# Patient Record
Sex: FEMALE | NOT HISPANIC OR LATINO | URBAN - METROPOLITAN AREA
[De-identification: names, ages, dates, MRNs, and addresses within clinical notes are randomized per-mention and may not be internally consistent; named-entity substitution may affect disease eponyms.]

---

## 2019-01-08 ENCOUNTER — INPATIENT (INPATIENT)
Facility: HOSPITAL | Age: 84
LOS: 6 days | Discharge: HOME | DRG: 202 | End: 2019-01-15
Attending: HOSPITALIST | Admitting: HOSPITALIST
Payer: MEDICARE

## 2019-01-08 ENCOUNTER — TRANSCRIPTION ENCOUNTER (OUTPATIENT)
Age: 84
End: 2019-01-08

## 2019-01-08 VITALS
DIASTOLIC BLOOD PRESSURE: 74 MMHG | OXYGEN SATURATION: 99 % | HEIGHT: 62 IN | WEIGHT: 139.99 LBS | TEMPERATURE: 99 F | SYSTOLIC BLOOD PRESSURE: 152 MMHG | HEART RATE: 100 BPM | RESPIRATION RATE: 18 BRPM

## 2019-01-08 DIAGNOSIS — Z96.641 PRESENCE OF RIGHT ARTIFICIAL HIP JOINT: Chronic | ICD-10-CM

## 2019-01-08 DIAGNOSIS — Z98.41 CATARACT EXTRACTION STATUS, RIGHT EYE: Chronic | ICD-10-CM

## 2019-01-08 LAB
ALBUMIN SERPL ELPH-MCNC: 3.2 G/DL — LOW (ref 3.5–5)
ALP SERPL-CCNC: 93 U/L — SIGNIFICANT CHANGE UP (ref 40–120)
ALT FLD-CCNC: 17 U/L DA — SIGNIFICANT CHANGE UP (ref 10–60)
ANION GAP SERPL CALC-SCNC: 9 MMOL/L — SIGNIFICANT CHANGE UP (ref 5–17)
AST SERPL-CCNC: 18 U/L — SIGNIFICANT CHANGE UP (ref 10–40)
BASE EXCESS BLDV CALC-SCNC: 1.2 MMOL/L — SIGNIFICANT CHANGE UP (ref -2–2)
BASOPHILS # BLD AUTO: 0.1 K/UL — SIGNIFICANT CHANGE UP (ref 0–0.2)
BASOPHILS NFR BLD AUTO: 1.1 % — SIGNIFICANT CHANGE UP (ref 0–2)
BILIRUB SERPL-MCNC: 0.4 MG/DL — SIGNIFICANT CHANGE UP (ref 0.2–1.2)
BLOOD GAS COMMENTS, VENOUS: SIGNIFICANT CHANGE UP
BUN SERPL-MCNC: 16 MG/DL — SIGNIFICANT CHANGE UP (ref 7–18)
CALCIUM SERPL-MCNC: 9.1 MG/DL — SIGNIFICANT CHANGE UP (ref 8.4–10.5)
CHLORIDE SERPL-SCNC: 102 MMOL/L — SIGNIFICANT CHANGE UP (ref 96–108)
CO2 SERPL-SCNC: 29 MMOL/L — SIGNIFICANT CHANGE UP (ref 22–31)
CREAT SERPL-MCNC: 0.76 MG/DL — SIGNIFICANT CHANGE UP (ref 0.5–1.3)
EOSINOPHIL # BLD AUTO: 0.3 K/UL — SIGNIFICANT CHANGE UP (ref 0–0.5)
EOSINOPHIL NFR BLD AUTO: 2.6 % — SIGNIFICANT CHANGE UP (ref 0–6)
GLUCOSE SERPL-MCNC: 120 MG/DL — HIGH (ref 70–99)
HCO3 BLDV-SCNC: 28 MMOL/L — SIGNIFICANT CHANGE UP (ref 21–29)
HCOV OC43 RNA SPEC QL NAA+PROBE: DETECTED
HCOV PNL SPEC NAA+PROBE: DETECTED
HCT VFR BLD CALC: 42.8 % — SIGNIFICANT CHANGE UP (ref 34.5–45)
HGB BLD-MCNC: 13.3 G/DL — SIGNIFICANT CHANGE UP (ref 11.5–15.5)
HOROWITZ INDEX BLDV+IHG-RTO: 21 — SIGNIFICANT CHANGE UP
LACTATE SERPL-SCNC: 1.7 MMOL/L — SIGNIFICANT CHANGE UP (ref 0.7–2)
LYMPHOCYTES # BLD AUTO: 2.5 K/UL — SIGNIFICANT CHANGE UP (ref 1–3.3)
LYMPHOCYTES # BLD AUTO: 22 % — SIGNIFICANT CHANGE UP (ref 13–44)
MCHC RBC-ENTMCNC: 27.2 PG — SIGNIFICANT CHANGE UP (ref 27–34)
MCHC RBC-ENTMCNC: 31.1 GM/DL — LOW (ref 32–36)
MCV RBC AUTO: 87.4 FL — SIGNIFICANT CHANGE UP (ref 80–100)
MONOCYTES # BLD AUTO: 1.2 K/UL — HIGH (ref 0–0.9)
MONOCYTES NFR BLD AUTO: 10.7 % — SIGNIFICANT CHANGE UP (ref 2–14)
NEUTROPHILS # BLD AUTO: 7.3 K/UL — SIGNIFICANT CHANGE UP (ref 1.8–7.4)
NEUTROPHILS NFR BLD AUTO: 63.6 % — SIGNIFICANT CHANGE UP (ref 43–77)
NT-PROBNP SERPL-SCNC: 88 PG/ML — SIGNIFICANT CHANGE UP (ref 0–450)
PCO2 BLDV: 57 MMHG — HIGH (ref 35–50)
PH BLDV: 7.31 — LOW (ref 7.35–7.45)
PLATELET # BLD AUTO: 297 K/UL — SIGNIFICANT CHANGE UP (ref 150–400)
PO2 BLDV: 30 MMHG — SIGNIFICANT CHANGE UP (ref 25–45)
POTASSIUM SERPL-MCNC: 3.6 MMOL/L — SIGNIFICANT CHANGE UP (ref 3.5–5.3)
POTASSIUM SERPL-SCNC: 3.6 MMOL/L — SIGNIFICANT CHANGE UP (ref 3.5–5.3)
PROT SERPL-MCNC: 7.9 G/DL — SIGNIFICANT CHANGE UP (ref 6–8.3)
RAPID RVP RESULT: DETECTED
RBC # BLD: 4.89 M/UL — SIGNIFICANT CHANGE UP (ref 3.8–5.2)
RBC # FLD: 13.1 % — SIGNIFICANT CHANGE UP (ref 10.3–14.5)
SAO2 % BLDV: 48 % — LOW (ref 67–88)
SODIUM SERPL-SCNC: 140 MMOL/L — SIGNIFICANT CHANGE UP (ref 135–145)
TROPONIN I SERPL-MCNC: <0.015 NG/ML — SIGNIFICANT CHANGE UP (ref 0–0.04)
WBC # BLD: 11.4 K/UL — HIGH (ref 3.8–10.5)
WBC # FLD AUTO: 11.4 K/UL — HIGH (ref 3.8–10.5)

## 2019-01-08 PROCEDURE — 71045 X-RAY EXAM CHEST 1 VIEW: CPT | Mod: 26

## 2019-01-08 RX ORDER — SODIUM CHLORIDE 9 MG/ML
500 INJECTION INTRAMUSCULAR; INTRAVENOUS; SUBCUTANEOUS ONCE
Qty: 0 | Refills: 0 | Status: COMPLETED | OUTPATIENT
Start: 2019-01-08 | End: 2019-01-08

## 2019-01-08 RX ORDER — AZITHROMYCIN 500 MG/1
1 TABLET, FILM COATED ORAL
Qty: 4 | Refills: 0 | OUTPATIENT
Start: 2019-01-08 | End: 2019-01-11

## 2019-01-08 RX ORDER — AZITHROMYCIN 500 MG/1
500 TABLET, FILM COATED ORAL ONCE
Qty: 0 | Refills: 0 | Status: COMPLETED | OUTPATIENT
Start: 2019-01-08 | End: 2019-01-08

## 2019-01-08 RX ORDER — ACETAMINOPHEN 500 MG
650 TABLET ORAL ONCE
Qty: 0 | Refills: 0 | Status: COMPLETED | OUTPATIENT
Start: 2019-01-08 | End: 2019-01-08

## 2019-01-08 RX ADMIN — Medication 650 MILLIGRAM(S): at 22:45

## 2019-01-08 RX ADMIN — SODIUM CHLORIDE 500 MILLILITER(S): 9 INJECTION INTRAMUSCULAR; INTRAVENOUS; SUBCUTANEOUS at 22:17

## 2019-01-08 RX ADMIN — Medication 650 MILLIGRAM(S): at 22:18

## 2019-01-08 NOTE — ED PROVIDER NOTE - PHYSICAL EXAMINATION
PE:   GEN: Awake, mildly lethargic, nonverbal elderly woman in NAD  HEAD: Atraumatic, normocephalic  EYES: Sclera white, conjunctiva pink, PERRLA  CARDIAC: borderline tachycardia, regular rhythm, S1,S2, no murmur/rub/gallop. Strong central and peripheral pulses, Brisk cap refill, no evident pedal edema.   RESP: Normal respiratory rate and effort, no resp distress, few scattered rhonchi, poor excursion  ABD: soft, non-tender, nondistended  NEURO: awake, responsive, exam limited by dementia  MSK: Moving all extremities spontaneously, no apparent deformities  SKIN: warm, dry

## 2019-01-08 NOTE — ED PROVIDER NOTE - MEDICAL DECISION MAKING DETAILS
3-4d infectious symptoms, no resp distress, fever at urgent care, r/o severe sepsis PNA, influenza, myocarditis, ACS. no PE risk factors except age and tachycardia likely 2/2 infection but if clinical picture changes r/o w/d-dimer. plan: ekg cbc bmp trop bnp vbg cxr reassess

## 2019-01-08 NOTE — ED PROVIDER NOTE - OBJECTIVE STATEMENT
89F h/o dementia depression pw 3-4d cough, decreased po intake and level of alertness, fever (102 at urgent care). contact with family member with uri at onset. family deny recent hospitalizations recent abx travel rashes vomiting diarrhea cardiac hx. hx limited by dementia.

## 2019-01-08 NOTE — ED PROVIDER NOTE - PROGRESS NOTE DETAILS
on repeat VS tachycardia worsened, d/w family and agreed to admission now, turned over to medicine team, dimer added

## 2019-01-09 ENCOUNTER — TRANSCRIPTION ENCOUNTER (OUTPATIENT)
Age: 84
End: 2019-01-09

## 2019-01-09 DIAGNOSIS — J06.9 ACUTE UPPER RESPIRATORY INFECTION, UNSPECIFIED: ICD-10-CM

## 2019-01-09 DIAGNOSIS — A41.9 SEPSIS, UNSPECIFIED ORGANISM: ICD-10-CM

## 2019-01-09 DIAGNOSIS — R00.0 TACHYCARDIA, UNSPECIFIED: ICD-10-CM

## 2019-01-09 DIAGNOSIS — G30.8 OTHER ALZHEIMER'S DISEASE: ICD-10-CM

## 2019-01-09 DIAGNOSIS — G93.41 METABOLIC ENCEPHALOPATHY: ICD-10-CM

## 2019-01-09 DIAGNOSIS — R09.02 HYPOXEMIA: ICD-10-CM

## 2019-01-09 DIAGNOSIS — Z29.9 ENCOUNTER FOR PROPHYLACTIC MEASURES, UNSPECIFIED: ICD-10-CM

## 2019-01-09 LAB
ANION GAP SERPL CALC-SCNC: 7 MMOL/L — SIGNIFICANT CHANGE UP (ref 5–17)
APTT BLD: 37.4 SEC — HIGH (ref 27.5–36.3)
BASOPHILS # BLD AUTO: 0.1 K/UL — SIGNIFICANT CHANGE UP (ref 0–0.2)
BASOPHILS NFR BLD AUTO: 0.9 % — SIGNIFICANT CHANGE UP (ref 0–2)
BUN SERPL-MCNC: 14 MG/DL — SIGNIFICANT CHANGE UP (ref 7–18)
CALCIUM SERPL-MCNC: 8.6 MG/DL — SIGNIFICANT CHANGE UP (ref 8.4–10.5)
CHLORIDE SERPL-SCNC: 109 MMOL/L — HIGH (ref 96–108)
CO2 SERPL-SCNC: 27 MMOL/L — SIGNIFICANT CHANGE UP (ref 22–31)
CREAT SERPL-MCNC: 0.71 MG/DL — SIGNIFICANT CHANGE UP (ref 0.5–1.3)
EOSINOPHIL # BLD AUTO: 0.1 K/UL — SIGNIFICANT CHANGE UP (ref 0–0.5)
EOSINOPHIL NFR BLD AUTO: 1.6 % — SIGNIFICANT CHANGE UP (ref 0–6)
GLUCOSE SERPL-MCNC: 126 MG/DL — HIGH (ref 70–99)
HCT VFR BLD CALC: 40.6 % — SIGNIFICANT CHANGE UP (ref 34.5–45)
HGB BLD-MCNC: 12.9 G/DL — SIGNIFICANT CHANGE UP (ref 11.5–15.5)
INR BLD: 1.17 RATIO — HIGH (ref 0.88–1.16)
LYMPHOCYTES # BLD AUTO: 2.7 K/UL — SIGNIFICANT CHANGE UP (ref 1–3.3)
LYMPHOCYTES # BLD AUTO: 30.6 % — SIGNIFICANT CHANGE UP (ref 13–44)
MCHC RBC-ENTMCNC: 27.5 PG — SIGNIFICANT CHANGE UP (ref 27–34)
MCHC RBC-ENTMCNC: 31.7 GM/DL — LOW (ref 32–36)
MCV RBC AUTO: 86.9 FL — SIGNIFICANT CHANGE UP (ref 80–100)
MONOCYTES # BLD AUTO: 0.8 K/UL — SIGNIFICANT CHANGE UP (ref 0–0.9)
MONOCYTES NFR BLD AUTO: 8.8 % — SIGNIFICANT CHANGE UP (ref 2–14)
NEUTROPHILS # BLD AUTO: 5.2 K/UL — SIGNIFICANT CHANGE UP (ref 1.8–7.4)
NEUTROPHILS NFR BLD AUTO: 58.1 % — SIGNIFICANT CHANGE UP (ref 43–77)
PLATELET # BLD AUTO: 266 K/UL — SIGNIFICANT CHANGE UP (ref 150–400)
POTASSIUM SERPL-MCNC: 3.3 MMOL/L — LOW (ref 3.5–5.3)
POTASSIUM SERPL-SCNC: 3.3 MMOL/L — LOW (ref 3.5–5.3)
PROTHROM AB SERPL-ACNC: 13.1 SEC — HIGH (ref 10–12.9)
RBC # BLD: 4.67 M/UL — SIGNIFICANT CHANGE UP (ref 3.8–5.2)
RBC # FLD: 12.6 % — SIGNIFICANT CHANGE UP (ref 10.3–14.5)
SODIUM SERPL-SCNC: 143 MMOL/L — SIGNIFICANT CHANGE UP (ref 135–145)
TSH SERPL-MCNC: 1.83 UU/ML — SIGNIFICANT CHANGE UP (ref 0.34–4.82)
WBC # BLD: 8.9 K/UL — SIGNIFICANT CHANGE UP (ref 3.8–10.5)
WBC # FLD AUTO: 8.9 K/UL — SIGNIFICANT CHANGE UP (ref 3.8–10.5)

## 2019-01-09 PROCEDURE — 99223 1ST HOSP IP/OBS HIGH 75: CPT

## 2019-01-09 PROCEDURE — 99285 EMERGENCY DEPT VISIT HI MDM: CPT | Mod: 25

## 2019-01-09 RX ORDER — AZITHROMYCIN 500 MG/1
500 TABLET, FILM COATED ORAL DAILY
Qty: 0 | Refills: 0 | Status: DISCONTINUED | OUTPATIENT
Start: 2019-01-09 | End: 2019-01-11

## 2019-01-09 RX ORDER — SODIUM CHLORIDE 9 MG/ML
1000 INJECTION INTRAMUSCULAR; INTRAVENOUS; SUBCUTANEOUS ONCE
Qty: 0 | Refills: 0 | Status: COMPLETED | OUTPATIENT
Start: 2019-01-09 | End: 2019-01-09

## 2019-01-09 RX ORDER — IPRATROPIUM/ALBUTEROL SULFATE 18-103MCG
3 AEROSOL WITH ADAPTER (GRAM) INHALATION EVERY 6 HOURS
Qty: 0 | Refills: 0 | Status: DISCONTINUED | OUTPATIENT
Start: 2019-01-09 | End: 2019-01-15

## 2019-01-09 RX ORDER — ACETAMINOPHEN 500 MG
325 TABLET ORAL EVERY 4 HOURS
Qty: 0 | Refills: 0 | Status: DISCONTINUED | OUTPATIENT
Start: 2019-01-09 | End: 2019-01-15

## 2019-01-09 RX ORDER — DONEPEZIL HYDROCHLORIDE 10 MG/1
10 TABLET, FILM COATED ORAL AT BEDTIME
Qty: 0 | Refills: 0 | Status: DISCONTINUED | OUTPATIENT
Start: 2019-01-09 | End: 2019-01-09

## 2019-01-09 RX ORDER — ENOXAPARIN SODIUM 100 MG/ML
40 INJECTION SUBCUTANEOUS EVERY 24 HOURS
Qty: 0 | Refills: 0 | Status: DISCONTINUED | OUTPATIENT
Start: 2019-01-09 | End: 2019-01-15

## 2019-01-09 RX ORDER — POTASSIUM CHLORIDE 20 MEQ
10 PACKET (EA) ORAL ONCE
Qty: 0 | Refills: 0 | Status: DISCONTINUED | OUTPATIENT
Start: 2019-01-09 | End: 2019-01-11

## 2019-01-09 RX ORDER — SODIUM CHLORIDE 9 MG/ML
1000 INJECTION INTRAMUSCULAR; INTRAVENOUS; SUBCUTANEOUS
Qty: 0 | Refills: 0 | Status: DISCONTINUED | OUTPATIENT
Start: 2019-01-09 | End: 2019-01-11

## 2019-01-09 RX ORDER — ESCITALOPRAM OXALATE 10 MG/1
20 TABLET, FILM COATED ORAL DAILY
Qty: 0 | Refills: 0 | Status: DISCONTINUED | OUTPATIENT
Start: 2019-01-09 | End: 2019-01-09

## 2019-01-09 RX ORDER — POTASSIUM CHLORIDE 20 MEQ
40 PACKET (EA) ORAL ONCE
Qty: 0 | Refills: 0 | Status: COMPLETED | OUTPATIENT
Start: 2019-01-09 | End: 2019-01-09

## 2019-01-09 RX ORDER — LABETALOL HCL 100 MG
5 TABLET ORAL ONCE
Qty: 0 | Refills: 0 | Status: COMPLETED | OUTPATIENT
Start: 2019-01-09 | End: 2019-01-09

## 2019-01-09 RX ADMIN — AZITHROMYCIN 500 MILLIGRAM(S): 500 TABLET, FILM COATED ORAL at 00:52

## 2019-01-09 RX ADMIN — ENOXAPARIN SODIUM 40 MILLIGRAM(S): 100 INJECTION SUBCUTANEOUS at 11:33

## 2019-01-09 RX ADMIN — Medication 3 MILLILITER(S): at 09:04

## 2019-01-09 RX ADMIN — Medication 40 MILLIEQUIVALENT(S): at 18:32

## 2019-01-09 RX ADMIN — SODIUM CHLORIDE 100 MILLILITER(S): 9 INJECTION INTRAMUSCULAR; INTRAVENOUS; SUBCUTANEOUS at 02:28

## 2019-01-09 RX ADMIN — Medication 5 MILLIGRAM(S): at 07:58

## 2019-01-09 RX ADMIN — AZITHROMYCIN 500 MILLIGRAM(S): 500 TABLET, FILM COATED ORAL at 11:33

## 2019-01-09 NOTE — H&P ADULT - ASSESSMENT
89y F bedbound with 24 hr home care  h/o dementia, depression was sent from the urgent care for cough x 1.5 weeks and fever since 1 day    EC course: Pt afebrile but tachycardic to 100-120s /74 RR 18 / 99 on supp O2   Labs : WBC count 11.4 with no left shift, pro BNP 88   RVP +ve for corona virus  CXR at urgent care 1/8 /19 : no active disease    Pt will admitted for viral URI and tachycardia

## 2019-01-09 NOTE — H&P ADULT - ATTENDING COMMENTS
Patient seen and examined ; case was discussed with the admitting resident    ROS: as in the HPI; all other ROS negative    SH and family history as above    Vital Signs Last 24 Hrs  T(C): 37 (08 Jan 2019 19:33), Max: 37 (08 Jan 2019 19:33)  T(F): 98.6 (08 Jan 2019 19:33), Max: 98.6 (08 Jan 2019 19:33)  HR: 120 (08 Jan 2019 23:02) (100 - 120)  BP: 152/88 (08 Jan 2019 23:02) (152/74 - 152/88)  BP(mean): --  RR: 16 (08 Jan 2019 23:02) (16 - 18)  SpO2: 96% (08 Jan 2019 23:02) (96% - 99%)    GEN: NAD, nonverbal, acutely ill appearing.   HEENT- normocephalic; mouth dry   CVS- S1S2+  LUNGS- rales  ABD: Soft , nontender, nondistended, Bowel sounds are present  EXTREMITY: no calf tenderness, no cyanosis, no edema  NEURO: does not follow simple commands, not interactive, withdraws from pain   VASCULAR: + distal peripheral pulses  SKIN: warm, diaphoretic       Labs Reviewed:                         13.3   11.4  )-----------( 297      ( 08 Jan 2019 21:02 )             42.8     01-08    140  |  102  |  16  ----------------------------<  120<H>  3.6   |  29  |  0.76    Ca    9.1      08 Jan 2019 21:02    TPro  7.9  /  Alb  3.2<L>  /  TBili  0.4  /  DBili  x   /  AST  18  /  ALT  17  /  AlkPhos  93  01-08    CARDIAC MARKERS ( 08 Jan 2019 21:02 )  <0.015 ng/mL / x     / x     / x     / x              BNP: Serum Pro-Brain Natriuretic Peptide: 88 pg/mL (01-08 @ 21:02)    MEDICATIONS  (STANDING):  azithromycin   Tablet 500 milliGRAM(s) Oral daily  enoxaparin Injectable 40 milliGRAM(s) SubCutaneous every 24 hours    MEDICATIONS  (PRN):  acetaminophen   Tablet .. 325 milliGRAM(s) Oral every 4 hours PRN Temp greater or equal to 38C (100.4F), Moderate Pain (4 - 6)      CXR reviewed    EKG Reviewed    89 y/o F with dementia, fully dependent for ADLs, bed bound with 24h care at home admitted with sepsis 2/2 corona virus infection. Was febrile 102 at urgent care with oxygen sat 93%.    1.Coronavius pnuemonia-  no h/o COPD or obstructive lung dz, but will cover with macrolide for presumptive cap for now. IVF, supportive care. Consider CT chest if not responding to current therapies.   2.Sepsis- febrile at urgent care, has received tylenol here, leukocytosis of 11.4, and tachycardia of 120. UA pending.   3.Encephalopathy- TME- 2/2 sepsis, baseline dementia with decreased interaction per family. Patient with pureed diet andthickened liquids, asp precautions. Patient is full code.   4.Hypoxia- Oxygen saturation on room air 93% on arrival, has improved 96% on room air but needs monitoring.     Dispo- patient's son Cam Levy reluctant to admit his mom since she has 24 hour care at home and good medical follow up. Also concerned about nosocomial infection and an unfamiliar environment for his mother, all understandable concerns. He is hoping for a dc 1/9/19, we discussed plan of care at length and he is agreeable for overnight monitoring and reassessment in am, juan diego given tachycardia after IVF and reported hypoxia.   He can be reached at c- [phone]  h- [phone]    His brother Sam is also involved in care. h- [phone]

## 2019-01-09 NOTE — H&P ADULT - PROBLEM SELECTOR PLAN 4
pt has baseline dementia   c/w donepezil   Aspiration precautions   Fall precautions  Full code for now  family reluctant for adm, as pt has good home care pt has baseline dementia   Aspiration precautions   Fall precautions  Full code for now  family reluctant for adm, as pt has good home care

## 2019-01-09 NOTE — H&P ADULT - PROBLEM SELECTOR PLAN 5
Os sat was 93% in urgent care which improved to 95% on 2 L NC  pt now sating 96% on room air  c/w Suppl O2 prn   Monitor

## 2019-01-09 NOTE — H&P ADULT - PROBLEM SELECTOR PLAN 2
Pt had fever of 100.2 at urgent care with tachycardia  D/d viral URI   c/w ABx and supportive care   f/u UA

## 2019-01-09 NOTE — H&P ADULT - PROBLEM SELECTOR PLAN 1
No h/o lung disease  p/w cough and fever   RVP +ve ,corona virus  CXR : no active disease  likely viral URI  Pt sating 96 5 on RA now  symptomatic Tx with IVF, duonebs, suppl O2 PRN  will start on Zithromax for presumptive CAP  Pureed diet for now   f/u SnS   Aspiration precautions No h/o lung disease  p/w cough and fever   RVP +ve ,corona virus  CXR : no active disease  likely viral URI  Pt sating 96 5 on RA now  symptomatic Tx with IVF, duonebs, suppl O2 PRN  will start on Zithromax for presumptive CAP  Pureed diet for now   f/u SnS   Aspiration precautions  ***** Primary team to confirm home medication dosages with the pharmacy

## 2019-01-09 NOTE — H&P ADULT - HISTORY OF PRESENT ILLNESS
89y F bedbound with 24 hr home care  h/o dementia, depression was sent from the urgent care for cough x 1.5 weeks and fever since 1 day. Pt had fever of 100.2 F at urgent care with O2 sat not going above 93% on Room air which improved to 95% on 2 L NC. Portable CXR was done at urgent care which didnt showed any evidence of PNA or acute disease, unchanged from 2016 xray. Family deny recent hospitalizations, recent abx use,travel ,rashes, vomiting ,diarrhea.History limited by dementia.  In ED pt was afebrile with tachycardia 100-120s.

## 2019-01-09 NOTE — ED ADULT NURSE NOTE - NSIMPLEMENTINTERV_GEN_ALL_ED
Implemented All Fall Risk Interventions:  [place] to call system. Call bell, personal items and telephone within reach. Instruct patient to call for assistance. Room bathroom lighting operational. Non-slip footwear when patient is off stretcher. Physically safe environment: no spills, clutter or unnecessary equipment. Stretcher in lowest position, wheels locked, appropriate side rails in place. Provide visual cue, wrist band, yellow gown, etc. Monitor gait and stability. Monitor for mental status changes and reorient to person, place, and time. Review medications for side effects contributing to fall risk. Reinforce activity limits and safety measures with patient and family.

## 2019-01-09 NOTE — ED ADULT NURSE REASSESSMENT NOTE - NS ED NURSE REASSESS COMMENT FT1
no changes noted Pt on N/C 3lit/min medicated as ordered.comfort and safety maintained  as per son Pt is pure vegetarian diet (no eggs meat or chicken)RAFIA Momin informed to  place an order , transferred to holding endorsed to Nelida ALICEA

## 2019-01-09 NOTE — H&P ADULT - PROBLEM SELECTOR PLAN 6
IMPROVE VTE Individual Risk Assessment    RISK                                                                Points  [  ] Previous VTE                                                  3  [  ] Thrombophilia                                               2  [  ] Lower limb paralysis                                      2        (unable to hold up >15 seconds)    [  ] Current Cancer                                              2         (within 6 months)  [  ] Immobilization > 24 hrs                                1  [  ] ICU/CCU stay > 24 hours                              1  [  ] Age > 60                                                      1  IMPROVE VTE Score _____2___  DVT ppx : heparin SQ   no need for GI ppx

## 2019-01-10 ENCOUNTER — TRANSCRIPTION ENCOUNTER (OUTPATIENT)
Age: 84
End: 2019-01-10

## 2019-01-10 LAB
ANION GAP SERPL CALC-SCNC: 8 MMOL/L — SIGNIFICANT CHANGE UP (ref 5–17)
BUN SERPL-MCNC: 16 MG/DL — SIGNIFICANT CHANGE UP (ref 7–18)
CALCIUM SERPL-MCNC: 8.3 MG/DL — LOW (ref 8.4–10.5)
CHLORIDE SERPL-SCNC: 111 MMOL/L — HIGH (ref 96–108)
CO2 SERPL-SCNC: 26 MMOL/L — SIGNIFICANT CHANGE UP (ref 22–31)
CREAT SERPL-MCNC: 0.69 MG/DL — SIGNIFICANT CHANGE UP (ref 0.5–1.3)
GLUCOSE SERPL-MCNC: 109 MG/DL — HIGH (ref 70–99)
HCT VFR BLD CALC: 35.5 % — SIGNIFICANT CHANGE UP (ref 34.5–45)
HGB BLD-MCNC: 11 G/DL — LOW (ref 11.5–15.5)
MCHC RBC-ENTMCNC: 27.4 PG — SIGNIFICANT CHANGE UP (ref 27–34)
MCHC RBC-ENTMCNC: 30.8 GM/DL — LOW (ref 32–36)
MCV RBC AUTO: 88.7 FL — SIGNIFICANT CHANGE UP (ref 80–100)
PLATELET # BLD AUTO: 238 K/UL — SIGNIFICANT CHANGE UP (ref 150–400)
POTASSIUM SERPL-MCNC: 3.6 MMOL/L — SIGNIFICANT CHANGE UP (ref 3.5–5.3)
POTASSIUM SERPL-SCNC: 3.6 MMOL/L — SIGNIFICANT CHANGE UP (ref 3.5–5.3)
RBC # BLD: 4 M/UL — SIGNIFICANT CHANGE UP (ref 3.8–5.2)
RBC # FLD: 12.9 % — SIGNIFICANT CHANGE UP (ref 10.3–14.5)
SODIUM SERPL-SCNC: 145 MMOL/L — SIGNIFICANT CHANGE UP (ref 135–145)
WBC # BLD: 7.1 K/UL — SIGNIFICANT CHANGE UP (ref 3.8–10.5)
WBC # FLD AUTO: 7.1 K/UL — SIGNIFICANT CHANGE UP (ref 3.8–10.5)

## 2019-01-10 PROCEDURE — 99232 SBSQ HOSP IP/OBS MODERATE 35: CPT | Mod: GC

## 2019-01-10 RX ORDER — AZITHROMYCIN 500 MG/1
1 TABLET, FILM COATED ORAL
Qty: 3 | Refills: 0 | OUTPATIENT
Start: 2019-01-10 | End: 2019-01-12

## 2019-01-10 RX ADMIN — Medication 3 MILLILITER(S): at 14:22

## 2019-01-10 RX ADMIN — Medication 3 MILLILITER(S): at 02:30

## 2019-01-10 RX ADMIN — SODIUM CHLORIDE 60 MILLILITER(S): 9 INJECTION INTRAMUSCULAR; INTRAVENOUS; SUBCUTANEOUS at 18:24

## 2019-01-10 RX ADMIN — Medication 3 MILLILITER(S): at 20:12

## 2019-01-10 RX ADMIN — ENOXAPARIN SODIUM 40 MILLIGRAM(S): 100 INJECTION SUBCUTANEOUS at 12:08

## 2019-01-10 RX ADMIN — Medication 3 MILLILITER(S): at 08:50

## 2019-01-10 RX ADMIN — AZITHROMYCIN 500 MILLIGRAM(S): 500 TABLET, FILM COATED ORAL at 12:09

## 2019-01-10 RX ADMIN — SODIUM CHLORIDE 60 MILLILITER(S): 9 INJECTION INTRAMUSCULAR; INTRAVENOUS; SUBCUTANEOUS at 03:28

## 2019-01-10 NOTE — PROGRESS NOTE ADULT - ATTENDING COMMENTS
Patient was seen and examined by myself. Case was discussed with house staff in details. I have reviewed and agree with the plan as outlined above with edits where appropriate.    Vital Signs Last 24 Hrs  T(C): 37.1 (10 Miguel 2019 14:29), Max: 37.7 (10 Miguel 2019 05:19)  T(F): 98.8 (10 Miguel 2019 14:29), Max: 99.9 (10 Miguel 2019 05:19)  HR: 96 (10 Miguel 2019 14:29) (87 - 112)  BP: 136/61 (10 Miguel 2019 14:29) (130/62 - 144/74)  BP(mean): --  RR: 16 (10 Miguel 2019 14:29) (16 - 18)  SpO2: 100% (10 Miguel 2019 14:29) (100% - 100%)    A/P: 89 y/o F with acute viral bronchitis and advanced dementia-   Sepsis ruled out - no evidence of bacterial infection  Continue supportive measures  discharge planning.  Discussed with case manger and home attendant.  Called patients son and left message.

## 2019-01-10 NOTE — PROGRESS NOTE ADULT - PROBLEM SELECTOR PLAN 1
Supportive measure. RVP positive, chest x-ray normal  Supportive measure.  Bronchodilator as needed.  Continue azithromycin RVP positive- corona virus, chest x-ray normal  Supportive measure.  Bronchodilator as needed.  Continue azithromycin

## 2019-01-10 NOTE — PROGRESS NOTE ADULT - ASSESSMENT
89y F bedbound with 24 hr home care  h/o dementia, depression was sent from the urgent care for cough x 1.5 weeks and fever since 1 day.  ***This note is incomlete 89y F bedbound with 24 hr home care  h/o dementia, depression was sent from the urgent care for cough x 1.5 weeks and fever since 1 day.

## 2019-01-10 NOTE — ADVANCED PRACTICE NURSE CONSULT - RECOMMEDATIONS
-Clean all wounds with normal saline and apply skin prep to the surrounding skin  -Apply a Foam dressing to the Coccyx wound Q 72hrs PRN  -Elevate/float the patients heels using heel protectors and reposition the patient Q 2hrs using wedges or pillows

## 2019-01-10 NOTE — DISCHARGE NOTE ADULT - MEDICATION SUMMARY - MEDICATIONS TO TAKE
I will START or STAY ON the medications listed below when I get home from the hospital:    escitalopram 20 mg oral tablet  -- 1 tab(s) by mouth once a day  -- Indication: For Depression    donepezil 10 mg oral tablet  -- 1 tab(s) by mouth once a day (at bedtime)  -- Indication: For Dementia    azithromycin 250 mg oral tablet  -- 1 tab(s) by mouth once a day   -- Do not take dairy products, antacids, or iron preparations within one hour of this medication.  Finish all this medication unless otherwise directed by prescriber.    -- Indication: For Acute upper respiratory infection I will START or STAY ON the medications listed below when I get home from the hospital:    CeleXA 20 mg oral tablet  -- 1 tab(s) by mouth once a day   -- It is very important that you take or use this exactly as directed.  Do not skip doses or discontinue unless directed by your doctor.  May cause drowsiness.  Alcohol may intensify this effect.  Use care when operating dangerous machinery.  Obtain medical advice before taking any non-prescription drugs as some may affect the action of this medication.    -- Indication: For depression    donepezil 5 mg oral tablet  -- 1 tab(s) by mouth once a day (at bedtime)  -- Indication: For dementia I will START or STAY ON the medications listed below when I get home from the hospital:    nebuliser  -- Indication: For Acute viral bronchitis    CeleXA 20 mg oral tablet  -- 1 tab(s) by mouth once a day   -- It is very important that you take or use this exactly as directed.  Do not skip doses or discontinue unless directed by your doctor.  May cause drowsiness.  Alcohol may intensify this effect.  Use care when operating dangerous machinery.  Obtain medical advice before taking any non-prescription drugs as some may affect the action of this medication.    -- Indication: For depression    ipratropium-albuterol 0.5 mg-2.5 mg/3 mLinhalation solution  -- 3 milliliter(s) by nebulizer every 6 hours, As Needed -for shortness of breath and/or wheezing   -- For inhalation only.  It is very important that you take or use this exactly as directed.  Do not skip doses or discontinue unless directed by your doctor.  Obtain medical advice before taking any non-prescription drugs as some may affect the action of this medication.    -- Indication: For Acute viral bronchitis    donepezil 5 mg oral tablet  -- 1 tab(s) by mouth once a day (at bedtime)  -- Indication: For dementia

## 2019-01-10 NOTE — DISCHARGE NOTE ADULT - PLAN OF CARE
To prevent complication You presented with cough and fever. Your chest x-ray was normal. rapid viral panel test showed positive for corona virus infection. You should consume enough fluid and take rest. You should continue medication as above and follow up with your primary care provider. To limit progression You should continue your medication as above and follow up with your primary care provider. You presented with cough and fever. Your chest x-ray was normal. rapid viral panel test showed positive for corona virus infection. You should consume enough fluid and take rest. You were also seen by speech and swallow pathologist who recommended Dysphagia Puree Diet & Honey Thick Liquids. You should continue medication as above and follow up with your primary care provider.

## 2019-01-10 NOTE — ADVANCED PRACTICE NURSE CONSULT - ASSESSMENT
This is a 89yr old female patient admitted for Acute URI, presenting with the following:  -There is a Stage 1 Pressure Injury to the Bilateral Heels, as evident by non-blanchable erythema  -There is a Stage 2 Pressure Injury to the Coccyx (0.5cm x 0.5cm) with red tissue and scant drainage

## 2019-01-10 NOTE — DISCHARGE NOTE ADULT - HOSPITAL COURSE
89y/o  Female  bedbound with 24 hr home care  with pmh of  dementia, depression was sent from the urgent care for cough  since 1.5 weeks and fever since 1 day. Pt had fever of 100.2 F at urgent care with O2 sat not going above 93% on Room air which improved to 95% on 2 L NC. In ED pt was afebrile with tachycardia 100-120s. Elevated WBC was noted. Chest X-ray was normal. RVP showed positive for corona virus. Supportive treatment was given along with oral azithromycin. pt is stable for discharge. Case has been discussed with the attending. This is just a summary of the case. For further information please refer to pt. chart document. 89y/o  Female  bedbound with 24 hr home care  with pmh of  dementia, depression was sent from the urgent care for cough  since 1.5 weeks and fever since 1 day. Pt had fever of 100.2 F at urgent care with O2 sat not going above 93% on Room air which improved to 95% on 2 L NC. In ED pt was afebrile with tachycardia 100-120s. Elevated WBC was noted. Chest X-ray was normal. RVP showed positive for corona virus. Supportive treatment was given along with oral azithromycin. Pt had respi distress for which she was given iv solumedrol and s/s eval done: recc Dysphagia Puree Diet & Honey Thick Liquids.Pt is stable for discharge. Case has been discussed with the attending. This is just a summary of the case. For further information please refer to pt. chart document.

## 2019-01-10 NOTE — PROGRESS NOTE ADULT - SUBJECTIVE AND OBJECTIVE BOX
PGY 1 Note discussed with supervising resident and primary attending    Patient is a 89y old  Female who presents with a chief complaint of Sepsis and tachycardia (09 Jan 2019 04:14)      INTERVAL HPI/OVERNIGHT EVENTS: Pt seen and examined at bedside. Pt seems drowsy.    MEDICATIONS  (STANDING):  ALBUTerol/ipratropium for Nebulization 3 milliLiter(s) Nebulizer every 6 hours  azithromycin   Tablet 500 milliGRAM(s) Oral daily  enoxaparin Injectable 40 milliGRAM(s) SubCutaneous every 24 hours  potassium chloride  10 mEq/100 mL IVPB 10 milliEquivalent(s) IV Intermittent once  sodium chloride 0.9%. 1000 milliLiter(s) (60 mL/Hr) IV Continuous <Continuous>    MEDICATIONS  (PRN):  acetaminophen   Tablet .. 325 milliGRAM(s) Oral every 4 hours PRN Temp greater or equal to 38C (100.4F), Moderate Pain (4 - 6)      __________________________________________________  REVIEW OF SYSTEMS: Pt seems drowsy and unable to obtain ROS.      Vital Signs Last 24 Hrs  T(C): 37.7 (10 Miguel 2019 05:19), Max: 37.7 (10 Miguel 2019 05:19)  T(F): 99.9 (10 Miguel 2019 05:19), Max: 99.9 (10 Miguel 2019 05:19)  HR: 87 (10 Miguel 2019 05:19) (87 - 112)  BP: 136/56 (10 Miguel 2019 05:19) (130/62 - 144/74)  BP(mean): --  RR: 17 (10 Miguel 2019 05:19) (17 - 18)  SpO2: 100% (10 Miguel 2019 05:19) (100% - 100%)    ________________________________________________  PHYSICAL EXAM:  GENERAL: NAD  HEENT: Normocephalic;  conjunctivae and sclerae clear; moist mucous membranes;   NECK : supple  CHEST/LUNG: Clear to auscultation bilaterally, diminished breath sound at bases.  HEART: S1 S2  regular; no murmurs, gallops or rubs  ABDOMEN: Soft, Nontender, Nondistended; Bowel sounds present  EXTREMITIES: no cyanosis; no edema; no calf tenderness  SKIN: warm and dry; no rash  NERVOUS SYSTEM:  Pt seems drowsy, Cranial nerves intact, not able to examine strength as pt is drowsy. _________________________________________________  LABS:                        11.0   7.1   )-----------( 238      ( 10 Miguel 2019 07:15 )             35.5     01-10    145  |  111<H>  |  16  ----------------------------<  109<H>  3.6   |  26  |  0.69    Ca    8.3<L>      10 Miguel 2019 07:15    TPro  7.9  /  Alb  3.2<L>  /  TBili  0.4  /  DBili  x   /  AST  18  /  ALT  17  /  AlkPhos  93  01-08    PT/INR - ( 09 Jan 2019 05:30 )   PT: 13.1 sec;   INR: 1.17 ratio         PTT - ( 09 Jan 2019 05:30 )  PTT:37.4 sec    CAPILLARY BLOOD GLUCOSE            RADIOLOGY & ADDITIONAL TESTS:< from: Xray Chest 1 View-PORTABLE IMMEDIATE (01.08.19 @ 22:01) >    EXAM:  XR CHEST PORTABLE IMMED 1V                            PROCEDURE DATE:  01/08/2019          INTERPRETATION:  INDICATION: cough, shortness of breath    PRIORS: January 8, 2019    VIEWS: Portable AP radiography of the chest performed. Evaluation limited   secondary to shallow inspiration and patient positioning; the patient's   chin/face obscures the superior mediastinal region as well as portions of   the bilateral upper lobes.    FINDINGS: Heart size appears within normal limits. No definite hilar   masses are identified. Horizontally oriented linear radiodensity   overlying the upper thorax is likely extrinsic to the patient. Correlate   clinically. There is no evidence for focal infiltrate, lobar   consolidation or pulmonary edema. No pleural effusion or pneumothorax is   demonstrated. No mediastinal shift is noted. No acute osseous fractures   identified.    IMPRESSION: No evidence for focal infiltrate or lobar consolidation.              Plan of care was discussed with patient and /or primary care giver; all questions and concerns were addressed and care was aligned with patient's wishes. PGY 1 Note discussed with supervising resident and primary attending    Patient is a 89y old  Female who presents with a chief complaint of Sepsis and tachycardia (09 Jan 2019 04:14)      INTERVAL HPI/OVERNIGHT EVENTS: Pt seen and examined at bedside. Pt seems drowsy.    MEDICATIONS  (STANDING):  ALBUTerol/ipratropium for Nebulization 3 milliLiter(s) Nebulizer every 6 hours  azithromycin   Tablet 500 milliGRAM(s) Oral daily  enoxaparin Injectable 40 milliGRAM(s) SubCutaneous every 24 hours  potassium chloride  10 mEq/100 mL IVPB 10 milliEquivalent(s) IV Intermittent once  sodium chloride 0.9%. 1000 milliLiter(s) (60 mL/Hr) IV Continuous <Continuous>    MEDICATIONS  (PRN):  acetaminophen   Tablet .. 325 milliGRAM(s) Oral every 4 hours PRN Temp greater or equal to 38C (100.4F), Moderate Pain (4 - 6)      __________________________________________________  REVIEW OF SYSTEMS: Pt seems drowsy and unable to obtain ROS.      Vital Signs Last 24 Hrs  T(C): 37.7 (10 Miguel 2019 05:19), Max: 37.7 (10 Miguel 2019 05:19)  T(F): 99.9 (10 Miguel 2019 05:19), Max: 99.9 (10 Miguel 2019 05:19)  HR: 87 (10 Miguel 2019 05:19) (87 - 112)  BP: 136/56 (10 Miguel 2019 05:19) (130/62 - 144/74)  BP(mean): --  RR: 17 (10 Miguel 2019 05:19) (17 - 18)  SpO2: 100% (10 Miguel 2019 05:19) (100% - 100%)    ________________________________________________  PHYSICAL EXAM:  GENERAL: NAD  HEENT: Normocephalic;  conjunctivae and sclerae clear; moist mucous membranes;   NECK : supple  CHEST/LUNG: np wheezing  HEART: S1 S2+  ABDOMEN: Soft, Nontender, Nondistended; Bowel sounds present  EXTREMITIES: no cyanosis; no edema; no calf tenderness  SKIN: warm and dry; no rash  NERVOUS SYSTEM: follows minimal commands. Limited exam due to advanced dementia    LABS                      11.0   7.1   )-----------( 238      ( 10 Miguel 2019 07:15 )             35.5     01-10    145  |  111<H>  |  16  ----------------------------<  109<H>  3.6   |  26  |  0.69    Ca    8.3<L>      10 Miguel 2019 07:15    TPro  7.9  /  Alb  3.2<L>  /  TBili  0.4  /  DBili  x   /  AST  18  /  ALT  17  /  AlkPhos  93  01-08    PT/INR - ( 09 Jan 2019 05:30 )   PT: 13.1 sec;   INR: 1.17 ratio         PTT - ( 09 Jan 2019 05:30 )  PTT:37.4 sec    CAPILLARY BLOOD GLUCOSE      Rapid RVP Result: Detected:   OC43 Coronavirus (RapRVP): Detected          RADIOLOGY & ADDITIONAL TESTS:< from: Xray Chest 1 View-PORTABLE IMMEDIATE (01.08.19 @ 22:01) >    EXAM:  XR CHEST PORTABLE IMMED 1V                            PROCEDURE DATE:  01/08/2019          INTERPRETATION:  INDICATION: cough, shortness of breath    PRIORS: January 8, 2019    VIEWS: Portable AP radiography of the chest performed. Evaluation limited   secondary to shallow inspiration and patient positioning; the patient's   chin/face obscures the superior mediastinal region as well as portions of   the bilateral upper lobes.    FINDINGS: Heart size appears within normal limits. No definite hilar   masses are identified. Horizontally oriented linear radiodensity   overlying the upper thorax is likely extrinsic to the patient. Correlate   clinically. There is no evidence for focal infiltrate, lobar   consolidation or pulmonary edema. No pleural effusion or pneumothorax is   demonstrated. No mediastinal shift is noted. No acute osseous fractures   identified.    IMPRESSION: No evidence for focal infiltrate or lobar consolidation.

## 2019-01-10 NOTE — DISCHARGE NOTE ADULT - PATIENT PORTAL LINK FT
You can access the [email or web address] Patient Portal, offered by [facility], by registering with the following website: [email or web address]

## 2019-01-10 NOTE — DISCHARGE NOTE ADULT - MEDICATION SUMMARY - MEDICATIONS TO STOP TAKING
I will STOP taking the medications listed below when I get home from the hospital:    azithromycin 250 mg oral tablet  -- 1 tab(s) by mouth once a day   -- Do not take dairy products, antacids, or iron preparations within one hour of this medication.  Finish all this medication unless otherwise directed by prescriber.

## 2019-01-10 NOTE — DISCHARGE NOTE ADULT - CARE PLAN
Principal Discharge DX:	Viral upper respiratory tract infection  Goal:	To prevent complication  Secondary Diagnosis:	Alzheimer's disease of other onset without behavioral disturbance Principal Discharge DX:	Viral upper respiratory tract infection  Goal:	To prevent complication  Assessment and plan of treatment:	You presented with cough and fever. Your chest x-ray was normal. rapid viral panel test showed positive for corona virus infection. You should consume enough fluid and take rest. You should continue medication as above and follow up with your primary care provider.  Secondary Diagnosis:	Alzheimer's disease of other onset without behavioral disturbance  Goal:	To limit progression  Assessment and plan of treatment:	You should continue your medication as above and follow up with your primary care provider. Principal Discharge DX:	Viral upper respiratory tract infection  Goal:	To prevent complication  Assessment and plan of treatment:	You presented with cough and fever. Your chest x-ray was normal. rapid viral panel test showed positive for corona virus infection. You should consume enough fluid and take rest. You were also seen by speech and swallow pathologist who recommended Dysphagia Puree Diet & Honey Thick Liquids. You should continue medication as above and follow up with your primary care provider.  Secondary Diagnosis:	Alzheimer's disease of other onset without behavioral disturbance  Goal:	To limit progression  Assessment and plan of treatment:	You should continue your medication as above and follow up with your primary care provider.

## 2019-01-11 PROCEDURE — 71045 X-RAY EXAM CHEST 1 VIEW: CPT | Mod: 26

## 2019-01-11 PROCEDURE — 99233 SBSQ HOSP IP/OBS HIGH 50: CPT | Mod: GC

## 2019-01-11 RX ORDER — AMPICILLIN SODIUM AND SULBACTAM SODIUM 250; 125 MG/ML; MG/ML
INJECTION, POWDER, FOR SUSPENSION INTRAMUSCULAR; INTRAVENOUS
Qty: 0 | Refills: 0 | Status: COMPLETED | OUTPATIENT
Start: 2019-01-11 | End: 2019-01-11

## 2019-01-11 RX ORDER — IPRATROPIUM/ALBUTEROL SULFATE 18-103MCG
3 AEROSOL WITH ADAPTER (GRAM) INHALATION ONCE
Qty: 0 | Refills: 0 | Status: COMPLETED | OUTPATIENT
Start: 2019-01-11 | End: 2019-01-11

## 2019-01-11 RX ORDER — DONEPEZIL HYDROCHLORIDE 10 MG/1
5 TABLET, FILM COATED ORAL AT BEDTIME
Qty: 0 | Refills: 0 | Status: DISCONTINUED | OUTPATIENT
Start: 2019-01-11 | End: 2019-01-15

## 2019-01-11 RX ORDER — DONEPEZIL HYDROCHLORIDE 10 MG/1
1 TABLET, FILM COATED ORAL
Qty: 0 | Refills: 0 | COMMUNITY

## 2019-01-11 RX ORDER — ACETYLCYSTEINE 200 MG/ML
3 VIAL (ML) MISCELLANEOUS THREE TIMES A DAY
Qty: 0 | Refills: 0 | Status: COMPLETED | OUTPATIENT
Start: 2019-01-11 | End: 2019-01-12

## 2019-01-11 RX ORDER — SODIUM CHLORIDE 9 MG/ML
1000 INJECTION, SOLUTION INTRAVENOUS
Qty: 0 | Refills: 0 | Status: DISCONTINUED | OUTPATIENT
Start: 2019-01-11 | End: 2019-01-11

## 2019-01-11 RX ORDER — ESCITALOPRAM OXALATE 10 MG/1
1 TABLET, FILM COATED ORAL
Qty: 20 | Refills: 0 | OUTPATIENT
Start: 2019-01-11 | End: 2019-01-30

## 2019-01-11 RX ORDER — SODIUM CHLORIDE 9 MG/ML
1000 INJECTION INTRAMUSCULAR; INTRAVENOUS; SUBCUTANEOUS
Qty: 0 | Refills: 0 | Status: COMPLETED | OUTPATIENT
Start: 2019-01-11 | End: 2019-01-13

## 2019-01-11 RX ORDER — SODIUM CHLORIDE 9 MG/ML
1000 INJECTION INTRAMUSCULAR; INTRAVENOUS; SUBCUTANEOUS
Qty: 0 | Refills: 0 | Status: DISCONTINUED | OUTPATIENT
Start: 2019-01-11 | End: 2019-01-11

## 2019-01-11 RX ORDER — DONEPEZIL HYDROCHLORIDE 10 MG/1
1 TABLET, FILM COATED ORAL
Qty: 30 | Refills: 0 | OUTPATIENT
Start: 2019-01-11 | End: 2019-02-09

## 2019-01-11 RX ORDER — AMPICILLIN SODIUM AND SULBACTAM SODIUM 250; 125 MG/ML; MG/ML
3 INJECTION, POWDER, FOR SUSPENSION INTRAMUSCULAR; INTRAVENOUS ONCE
Qty: 0 | Refills: 0 | Status: COMPLETED | OUTPATIENT
Start: 2019-01-11 | End: 2019-01-11

## 2019-01-11 RX ORDER — CITALOPRAM 10 MG/1
1 TABLET, FILM COATED ORAL
Qty: 30 | Refills: 0 | OUTPATIENT
Start: 2019-01-11 | End: 2019-02-09

## 2019-01-11 RX ADMIN — Medication 3 MILLILITER(S): at 09:36

## 2019-01-11 RX ADMIN — Medication 3 MILLILITER(S): at 21:02

## 2019-01-11 RX ADMIN — ENOXAPARIN SODIUM 40 MILLIGRAM(S): 100 INJECTION SUBCUTANEOUS at 12:00

## 2019-01-11 RX ADMIN — DONEPEZIL HYDROCHLORIDE 5 MILLIGRAM(S): 10 TABLET, FILM COATED ORAL at 21:41

## 2019-01-11 RX ADMIN — Medication 3 MILLILITER(S): at 14:55

## 2019-01-11 RX ADMIN — Medication 3 MILLILITER(S): at 15:26

## 2019-01-11 RX ADMIN — AMPICILLIN SODIUM AND SULBACTAM SODIUM 200 GRAM(S): 250; 125 INJECTION, POWDER, FOR SUSPENSION INTRAMUSCULAR; INTRAVENOUS at 14:35

## 2019-01-11 RX ADMIN — SODIUM CHLORIDE 60 MILLILITER(S): 9 INJECTION INTRAMUSCULAR; INTRAVENOUS; SUBCUTANEOUS at 17:37

## 2019-01-11 RX ADMIN — AZITHROMYCIN 500 MILLIGRAM(S): 500 TABLET, FILM COATED ORAL at 11:59

## 2019-01-11 RX ADMIN — Medication 125 MILLIGRAM(S): at 15:34

## 2019-01-11 RX ADMIN — Medication 3 MILLILITER(S): at 21:01

## 2019-01-11 RX ADMIN — Medication 3 MILLILITER(S): at 02:51

## 2019-01-11 NOTE — PROGRESS NOTE ADULT - ATTENDING COMMENTS
Patient was seen and examined by myself. Case was discussed with house staff in details. I have reviewed and agree with the plan as outlined above with edits where appropriate.    Vital Signs Last 24 Hrs  T(C): 37 (11 Jan 2019 20:58), Max: 37.7 (11 Jan 2019 05:26)  T(F): 98.6 (11 Jan 2019 20:58), Max: 99.8 (11 Jan 2019 05:26)  HR: 114 (11 Jan 2019 20:58) (108 - 118)  BP: 167/67 (11 Jan 2019 20:58) (155/73 - 167/67)  BP(mean): --  RR: 17 (11 Jan 2019 20:58) (17 - 19)  SpO2: 100% (11 Jan 2019 20:58) (98% - 100%)                          11.0   7.1   )-----------( 238      ( 10 Miguel 2019 07:15 )             35.5     01-10    145  |  111<H>  |  16  ----------------------------<  109<H>  3.6   |  26  |  0.69    Ca    8.3<L>      10 Miguel 2019 07:15      A/P: Acute respiratory distress due to corona viral infection  2. dementia with functional quadriplegia  3. Dysphagia  4. essential HTN    was scheduled for discharge home today but developed acute respiratory distress with wheezing  - concern for aspiration pneumonitis  - empiric antibiotics given  Monitor closely.  Pan culture if develops fever.  Supportive measures  Overall prognosis is guarded  Advanced directives

## 2019-01-11 NOTE — CHART NOTE - NSCHARTNOTEFT_GEN_A_CORE
Patient was found to be in respiratory distress with diffuse wheezing. As per HHA at bedside she had lunch at 12:30pm and tolerated it well. At around 3:30pm she was SOB. She was found to be satting 96% on room air.   - Solu-medrol 125 IV push x 1  - Duoneb x 2 q10 minutes  - chest xray STAT- unchanged from prior  - Will start patient on D5 NS and order speech swallow evaluation  - NPO- risk of aspiration PNA?  - Will start  Unasyn  - Robitussin q6hr ATC x 48hr  - Duoneb q4hr  - Mucomyst TID    Attending aware. Also discussed with the son Mr. Levy over the phone. He is aware of the current condition of the patient.    GOALS OF CARE:  Discussed goals of care with the son (Mr. Levy) over the phone who states that patient is currently full code. He will further discuss with rest of the siblings and inform us if there is any change in code status of the patient.

## 2019-01-11 NOTE — PROGRESS NOTE ADULT - SUBJECTIVE AND OBJECTIVE BOX
PGY 1 Note discussed with supervising resident and primary attending    Patient is a 89y old  Female who presents with a chief complaint of Sepsis and tachycardia (10 Miguel 2019 14:51)      INTERVAL HPI/OVERNIGHT EVENTS: Pt seen and examined at bedside. Pt had episode of shortness breath and wheezing this afternoon.    MEDICATIONS  (STANDING):  acetylcysteine 10%  Inhalation 3 milliLiter(s) Inhalation three times a day  ALBUTerol/ipratropium for Nebulization 3 milliLiter(s) Nebulizer every 6 hours  ampicillin/sulbactam  IVPB      ampicillin/sulbactam  IVPB 3 Gram(s) IV Intermittent once  azithromycin   Tablet 500 milliGRAM(s) Oral daily  donepezil 5 milliGRAM(s) Oral at bedtime  enoxaparin Injectable 40 milliGRAM(s) SubCutaneous every 24 hours  guaiFENesin    Syrup 100 milliGRAM(s) Oral every 6 hours  potassium chloride  10 mEq/100 mL IVPB 10 milliEquivalent(s) IV Intermittent once  sodium chloride 0.9%. 1000 milliLiter(s) (60 mL/Hr) IV Continuous <Continuous>  sodium chloride 0.9%. 1000 milliLiter(s) (60 mL/Hr) IV Continuous <Continuous>    MEDICATIONS  (PRN):  acetaminophen   Tablet .. 325 milliGRAM(s) Oral every 4 hours PRN Temp greater or equal to 38C (100.4F), Moderate Pain (4 - 6)      __________________________________________________  REVIEW OF SYSTEMS:    CONSTITUTIONAL: No fever,   EYES: no acute visual disturbances  NECK: No pain or stiffness  RESPIRATORY: No cough; Shortness of breath  CARDIOVASCULAR: No chest pain, no palpitations  GASTROINTESTINAL: No pain. No nausea or vomiting; No diarrhea   NEUROLOGICAL: No headache or numbness, no tremors  MUSCULOSKELETAL: No joint pain, no muscle pain  GENITOURINARY: no dysuria, no frequency, no hesitancy  PSYCHIATRY: no depression , no anxiety  ALL OTHER  ROS negative        Vital Signs Last 24 Hrs  T(C): 36.2 (11 Jan 2019 14:28), Max: 37.7 (11 Jan 2019 05:26)  T(F): 97.2 (11 Jan 2019 14:28), Max: 99.8 (11 Jan 2019 05:26)  HR: 118 (11 Jan 2019 15:07) (63 - 118)  BP: 155/73 (11 Jan 2019 15:07) (117/55 - 157/75)  BP(mean): --  RR: 19 (11 Jan 2019 15:07) (16 - 19)  SpO2: 98% (11 Jan 2019 15:07) (98% - 100%)    ________________________________________________  PHYSICAL EXAM:  GENERAL: NAD  HEENT: Normocephalic;  conjunctivae and sclerae clear; moist mucous membranes;   NECK : supple  CHEST/LUNG: Clear to auscultation bilaterally with good air entry   HEART: S1 S2  regular; no murmurs, gallops or rubs  ABDOMEN: Soft, Nontender, Nondistended; Bowel sounds present  EXTREMITIES: no cyanosis; no edema; no calf tenderness  SKIN: warm and dry; no rash  NERVOUS SYSTEM:  Awake and alert; Oriented  to place, person and time ; no new deficits    _________________________________________________  LABS:                        11.0   7.1   )-----------( 238      ( 10 Miguel 2019 07:15 )             35.5     01-10    145  |  111<H>  |  16  ----------------------------<  109<H>  3.6   |  26  |  0.69    Ca    8.3<L>      10 Miguel 2019 07:15          CAPILLARY BLOOD GLUCOSE                Plan of care was discussed with patient and /or primary care giver; all questions and concerns were addressed and care was aligned with patient's wishes. PGY 1 Note discussed with supervising resident and primary attending    Patient is a 89y old  Female who presents with a chief complaint of Sepsis and tachycardia (10 Miguel 2019 14:51)      INTERVAL HPI/OVERNIGHT EVENTS: Pt seen and examined at bedside. Pt had episode of shortness breath and wheezing this afternoon.    MEDICATIONS  (STANDING):  acetylcysteine 10%  Inhalation 3 milliLiter(s) Inhalation three times a day  ALBUTerol/ipratropium for Nebulization 3 milliLiter(s) Nebulizer every 6 hours  ampicillin/sulbactam  IVPB      ampicillin/sulbactam  IVPB 3 Gram(s) IV Intermittent once  azithromycin   Tablet 500 milliGRAM(s) Oral daily  donepezil 5 milliGRAM(s) Oral at bedtime  enoxaparin Injectable 40 milliGRAM(s) SubCutaneous every 24 hours  guaiFENesin    Syrup 100 milliGRAM(s) Oral every 6 hours  potassium chloride  10 mEq/100 mL IVPB 10 milliEquivalent(s) IV Intermittent once  sodium chloride 0.9%. 1000 milliLiter(s) (60 mL/Hr) IV Continuous <Continuous>  sodium chloride 0.9%. 1000 milliLiter(s) (60 mL/Hr) IV Continuous <Continuous>    MEDICATIONS  (PRN):  acetaminophen   Tablet .. 325 milliGRAM(s) Oral every 4 hours PRN Temp greater or equal to 38C (100.4F), Moderate Pain (4 - 6)      __________________________________________________  REVIEW OF SYSTEMS: unable to participate in ROS due to dementia    CONSTITUTIONAL: No fever,       Vital Signs Last 24 Hrs  T(C): 36.2 (11 Jan 2019 14:28), Max: 37.7 (11 Jan 2019 05:26)  T(F): 97.2 (11 Jan 2019 14:28), Max: 99.8 (11 Jan 2019 05:26)  HR: 118 (11 Jan 2019 15:07) (63 - 118)  BP: 155/73 (11 Jan 2019 15:07) (117/55 - 157/75)  BP(mean): --  RR: 19 (11 Jan 2019 15:07) (16 - 19)  SpO2: 98% (11 Jan 2019 15:07) (98% - 100%)    ________________________________________________  PHYSICAL EXAM:  GENERAL: NAD  HEENT: Normocephalic;  conjunctivae and sclerae clear; moist mucous membranes;   NECK : supple  CHEST/LUNG: Clear to auscultation bilaterally with good air entry   HEART: S1 S2  regular; no murmurs, gallops or rubs  ABDOMEN: Soft, Nontender, Nondistended; Bowel sounds present  EXTREMITIES: no cyanosis; no edema; no calf tenderness  SKIN: warm and dry; no rash  NERVOUS SYSTEM:  Awake and alert; Oriented  to place, person and time ; no new deficits    _________________________________________________  LABS:                        11.0   7.1   )-----------( 238      ( 10 Miguel 2019 07:15 )             35.5     01-10    145  |  111<H>  |  16  ----------------------------<  109<H>  3.6   |  26  |  0.69    Ca    8.3<L>      10 Miguel 2019 07:15          CAPILLARY BLOOD GLUCOSE                Plan of care was discussed with patient and /or primary care giver; all questions and concerns were addressed and care was aligned with patient's wishes.

## 2019-01-11 NOTE — PROGRESS NOTE ADULT - PROBLEM SELECTOR PLAN 1
RVP positive- corona virus, chest x-ray normal  Supportive measure.  Bronchodilator as needed.  Continue azithromycin.  Pt had sob and wheezing today. s/p IV solumedrol 125 mg  F/u Chest x-ray.   Started on Unasyn

## 2019-01-11 NOTE — PROGRESS NOTE ADULT - ASSESSMENT
89y F bedbound with 24 hr home care  h/o dementia, depression was sent from the urgent care for cough x 1.5 weeks and fever since 1 day.  Pt had episode of sob and wheezing. STAT Duoneb and IV solumedrol was given. Chest X-ray was done and Unasyn was started.

## 2019-01-12 PROCEDURE — 99232 SBSQ HOSP IP/OBS MODERATE 35: CPT | Mod: GC

## 2019-01-12 RX ORDER — SODIUM CHLORIDE 9 MG/ML
1000 INJECTION, SOLUTION INTRAVENOUS
Qty: 0 | Refills: 0 | Status: COMPLETED | OUTPATIENT
Start: 2019-01-12 | End: 2019-01-13

## 2019-01-12 RX ORDER — AMLODIPINE BESYLATE 2.5 MG/1
5 TABLET ORAL ONCE
Qty: 0 | Refills: 0 | Status: COMPLETED | OUTPATIENT
Start: 2019-01-12 | End: 2019-01-12

## 2019-01-12 RX ADMIN — Medication 3 MILLILITER(S): at 09:18

## 2019-01-12 RX ADMIN — Medication 3 MILLILITER(S): at 20:20

## 2019-01-12 RX ADMIN — DONEPEZIL HYDROCHLORIDE 5 MILLIGRAM(S): 10 TABLET, FILM COATED ORAL at 22:14

## 2019-01-12 RX ADMIN — Medication 3 MILLILITER(S): at 14:38

## 2019-01-12 RX ADMIN — Medication 3 MILLILITER(S): at 14:36

## 2019-01-12 RX ADMIN — AMLODIPINE BESYLATE 5 MILLIGRAM(S): 2.5 TABLET ORAL at 12:21

## 2019-01-12 RX ADMIN — ENOXAPARIN SODIUM 40 MILLIGRAM(S): 100 INJECTION SUBCUTANEOUS at 12:22

## 2019-01-12 NOTE — DIETITIAN INITIAL EVALUATION ADULT. - FACTORS AFF FOOD INTAKE
pain/weakness, Acute upper respiratory infection, UTI, sepsis, alzheimer's/difficulty chewing/difficulty swallowing/difficulty with food procurement/preparation/other (specify)/change in mental status/difficulty feeding self

## 2019-01-12 NOTE — DIETITIAN INITIAL EVALUATION ADULT. - PROBLEM SELECTOR PLAN 4
pt has baseline dementia   Aspiration precautions   Fall precautions  Full code for now  family reluctant for adm, as pt has good home care

## 2019-01-12 NOTE — PROGRESS NOTE ADULT - ASSESSMENT
89y F bedbound with 24 hr home care  h/o dementia, depression was sent from the urgent care for cough x 1.5 weeks and fever since 1 day.  Pt is afebrile overnight and no acute events overnight. 89y F bedbound with 24 hr home care  h/o dementia, depression was sent from the urgent care for cough x 1.5 weeks and fever since 1 day.  Pt is afebrile overnight and no acute events overnight. Plan for discharge on monday.

## 2019-01-12 NOTE — SWALLOW BEDSIDE ASSESSMENT ADULT - PHARYNGEAL PHASE
Delayed pharyngeal swallow/Decreased laryngeal elevation/Wet vocal quality post oral intake Multiple swallows/Decreased laryngeal elevation/Throat clear post oral intake/Delayed pharyngeal swallow Delayed pharyngeal swallow/Decreased laryngeal elevation/Multiple swallows/Throat clear post oral intake

## 2019-01-12 NOTE — SWALLOW BEDSIDE ASSESSMENT ADULT - SWALLOW EVAL: RECOMMENDED FEEDING/EATING TECHNIQUES
check mouth frequently for oral residue/pocketing/crush medication (when feasible)/position upright (90 degrees)/alternate food with liquid/maintain upright posture during/after eating for 30 mins/no straws/allow for swallow between intakes/oral hygiene/small sips/bites

## 2019-01-12 NOTE — DIETITIAN INITIAL EVALUATION ADULT. - NUTRITION INTERVENTION
Medical Food Supplements/Vitamin/Feeding Assistance/Meals and Snack/Mineral/Collaboration and Referral of Nutrition Care

## 2019-01-12 NOTE — SWALLOW BEDSIDE ASSESSMENT ADULT - SLP PERTINENT HISTORY OF CURRENT PROBLEM
89y Female bedbound with 24 hr home care  h/o dementia, depression was sent from the urgent care for cough x 1.5 weeks and fever since 1 day.

## 2019-01-12 NOTE — DIETITIAN INITIAL EVALUATION ADULT. - MD RECOMMEND
Add Ensure Pudding 120ml x tid (510kcal 12g protein) & MVI/minerals/Vit. C 500mg/ZnSO 22mg daily medically feasible/po supplement

## 2019-01-12 NOTE — PROGRESS NOTE ADULT - SUBJECTIVE AND OBJECTIVE BOX
PGY 1 Note discussed with supervising resident and primary attending    Patient is a 89y old  Female who presents with a chief complaint of Sepsis and tachycardia (11 Jan 2019 15:49)      INTERVAL HPI/OVERNIGHT EVENTS: Pt seen and examined at bedside. No acute events overnight. Pt is afebrile overnight.    MEDICATIONS  (STANDING):  acetylcysteine 10%  Inhalation 3 milliLiter(s) Inhalation three times a day  ALBUTerol/ipratropium for Nebulization 3 milliLiter(s) Nebulizer every 6 hours  amLODIPine   Tablet 5 milliGRAM(s) Oral once  donepezil 5 milliGRAM(s) Oral at bedtime  enoxaparin Injectable 40 milliGRAM(s) SubCutaneous every 24 hours  guaiFENesin    Syrup 100 milliGRAM(s) Oral every 6 hours  sodium chloride 0.9%. 1000 milliLiter(s) (60 mL/Hr) IV Continuous <Continuous>    MEDICATIONS  (PRN):  acetaminophen   Tablet .. 325 milliGRAM(s) Oral every 4 hours PRN Temp greater or equal to 38C (100.4F), Moderate Pain (4 - 6)      __________________________________________________  REVIEW OF SYSTEMS:    CONSTITUTIONAL: No fever,   EYES: no acute visual disturbances  NECK: No pain or stiffness  RESPIRATORY: No cough; No shortness of breath  CARDIOVASCULAR: No chest pain, no palpitations  GASTROINTESTINAL: No pain. No nausea or vomiting; No diarrhea   NEUROLOGICAL: No headache or numbness, no tremors  MUSCULOSKELETAL: No joint pain, no muscle pain  GENITOURINARY: no dysuria, no frequency, no hesitancy  PSYCHIATRY: no depression , no anxiety  ALL OTHER  ROS negative        Vital Signs Last 24 Hrs  T(C): 36.7 (12 Jan 2019 05:07), Max: 37 (11 Jan 2019 20:58)  T(F): 98 (12 Jan 2019 05:07), Max: 98.6 (11 Jan 2019 20:58)  HR: 93 (12 Jan 2019 05:07) (93 - 118)  BP: 181/77 (12 Jan 2019 05:07) (155/73 - 181/77)  BP(mean): --  RR: 18 (12 Jan 2019 05:07) (17 - 19)  SpO2: 100% (12 Jan 2019 05:07) (98% - 100%)    ________________________________________________  PHYSICAL EXAM:  GENERAL: NAD  HEENT: Normocephalic;  conjunctivae and sclerae clear; moist mucous membranes;   NECK : supple  CHEST/LUNG: Decreased breath sound at bases and mild wheezing noted.   HEART: S1 S2  regular; no murmurs, gallops or rubs  ABDOMEN: Soft, Nontender, Nondistended; Bowel sounds present  EXTREMITIES: no cyanosis; no edema; no calf tenderness  SKIN: warm and dry; no rash  NERVOUS SYSTEM:  Awake and alert;  Drowsy and demented, bedbound  _________________________________________________  LABS:              CAPILLARY BLOOD GLUCOSE              Plan of care was discussed with patient and /or primary care giver; all questions and concerns were addressed and care was aligned with patient's wishes. PGY 1 Note discussed with supervising resident and primary attending    Patient is a 89y old  Female who presents with a chief complaint of Sepsis and tachycardia (11 Jan 2019 15:49)      INTERVAL HPI/OVERNIGHT EVENTS: Pt seen and examined at bedside. No acute events overnight. Pt is afebrile overnight.    MEDICATIONS  (STANDING):  acetylcysteine 10%  Inhalation 3 milliLiter(s) Inhalation three times a day  ALBUTerol/ipratropium for Nebulization 3 milliLiter(s) Nebulizer every 6 hours  amLODIPine   Tablet 5 milliGRAM(s) Oral once  donepezil 5 milliGRAM(s) Oral at bedtime  enoxaparin Injectable 40 milliGRAM(s) SubCutaneous every 24 hours  guaiFENesin    Syrup 100 milliGRAM(s) Oral every 6 hours  sodium chloride 0.9%. 1000 milliLiter(s) (60 mL/Hr) IV Continuous <Continuous>    MEDICATIONS  (PRN):  acetaminophen   Tablet .. 325 milliGRAM(s) Oral every 4 hours PRN Temp greater or equal to 38C (100.4F), Moderate Pain (4 - 6)      __________________________________________________  REVIEW OF SYSTEMS: poor historian due to dementia    CONSTITUTIONAL: No fever, no SOB; no diarrhea        Vital Signs Last 24 Hrs  T(C): 36.7 (12 Jan 2019 05:07), Max: 37 (11 Jan 2019 20:58)  T(F): 98 (12 Jan 2019 05:07), Max: 98.6 (11 Jan 2019 20:58)  HR: 93 (12 Jan 2019 05:07) (93 - 118)  BP: 181/77 (12 Jan 2019 05:07) (155/73 - 181/77)  BP(mean): --  RR: 18 (12 Jan 2019 05:07) (17 - 19)  SpO2: 100% (12 Jan 2019 05:07) (98% - 100%)    ________________________________________________  PHYSICAL EXAM:  GENERAL: NAD  HEENT: Normocephalic;  conjunctivae and sclerae clear; moist mucous membranes;   NECK : supple  CHEST/LUNG: Decreased breath sound at bases and mild wheezing noted.   HEART: S1 S2+  ABDOMEN: Soft, Nontender, Nondistended; Bowel sounds present  EXTREMITIES: no cyanosis; no edema;  SKIN: warm and dry; no rash  NERVOUS SYSTEM:   Drowsy; known dementia, bedbound  _________________________________________________  LABS:              CAPILLARY BLOOD GLUCOSE              Plan of care was discussed with patient and /or primary care giver; all questions and concerns were addressed and care was aligned with patient's wishes.

## 2019-01-12 NOTE — DIETITIAN INITIAL EVALUATION ADULT. - PROBLEM SELECTOR PLAN 1
No h/o lung disease  p/w cough and fever   RVP +ve ,corona virus  CXR : no active disease  likely viral URI  Pt sating 89 5 on RA now  symptomatic Tx with IVF, duonebs, suppl O2 PRN  will start on Zithromax for presumptive CAP  Pureed diet for now   f/u SnS   Aspiration precautions  ***** Primary team to confirm home medication dosages with the pharmacy

## 2019-01-12 NOTE — SWALLOW BEDSIDE ASSESSMENT ADULT - SWALLOW EVAL: DIAGNOSIS
Pt presented with s/s of oropharyngeal dysphagia. Reduced oral grading. Delayed oral transit & a-p transport. Initiation of swallow delayed w/reduced laryngeal elevation. Multiple swallows to clear boluses at times. Throat clearing, gurgly vocal quality, & aphonic post swallow at times. At risk of aspiration at this time.

## 2019-01-12 NOTE — SWALLOW BEDSIDE ASSESSMENT ADULT - ASR SWALLOW ASPIRATION MONITOR
cough/gurgly voice/pneumonia/oral hygiene/change of breathing pattern/position upright (90Y)/fever/throat clearing

## 2019-01-12 NOTE — PROGRESS NOTE ADULT - PROBLEM SELECTOR PLAN 1
RVP positive- corona virus, chest x-ray normal  Supportive measure.  Bronchodilator as needed.  Continue azithromycin.  Pt had sob and wheezing today. s/p IV solumedrol 125 mg  F/u Chest x-ray.   Started on Unasyn RVP positive- corona virus, chest x-ray normal  Supportive measure.  Bronchodilator as needed.  Continue azithromycin.  Pt had sob and wheezing yesterday . s/p IV solumedrol 125 mg and unasyn  Supportive care RVP positive- corona virus, chest x-ray normal  Supportive measure.  Bronchodilator as needed.  Continue azithromycin.  Pt had sob and wheezing yesterday . s/p IV solumedrol 125 mg and unasyn  Antibiotics discontinued  Supportive care

## 2019-01-12 NOTE — SWALLOW BEDSIDE ASSESSMENT ADULT - COMMENTS
Pt AA. HOB elevated to 45 degrees. Pt followed simple directions w/max cues & responded to a few simple questions appropriately. Daughter bedside half way through eval. Vocal quality slightly hoarse at baseline w/low volume. Tended to lean towards right side. Throat clearing noted prior to & after trials of foods. Throat clear post swallow 1/4 times. Aphonic post swallow 1/3 times. Throat clear post swallow 1/3 times.

## 2019-01-12 NOTE — DIETITIAN INITIAL EVALUATION ADULT. - OTHER INFO
Nutrition consult requested for pressure ulcers >2. Patient from home lives with family & bedbound. Visited pt. alert but nonverbal with feeding assistance, d/w PCA pt. appetite fair, intake 30-40% depending on how she "feels" will eat, encourage po intake, multiple pressure ulcers (stage 1 & II) with wound care noted. Discussed with RN.

## 2019-01-12 NOTE — PROVIDER CONTACT NOTE (OTHER) - BACKGROUND
admitted for acute upper resp infection; has bronchopneumonia; experienced wheezing & SOB during dayshift; d/c withheld

## 2019-01-12 NOTE — PROGRESS NOTE ADULT - PROBLEM SELECTOR PLAN 2
WBC trending down.  Pt  is afebrile now.  Continue with azithromycin  Started onUnasyn WBC trending down.  Pt  is afebrile now.

## 2019-01-12 NOTE — DIETITIAN INITIAL EVALUATION ADULT. - NS AS NUTRI INTERV MEDICAL AND FOOD SUPPLEMENTS
MD at bedside.    Add Ensure Pudding 120ml x tid (510kcal 12g protein) as medically feasible/Commercial beverage

## 2019-01-13 DIAGNOSIS — R13.10 DYSPHAGIA, UNSPECIFIED: ICD-10-CM

## 2019-01-13 DIAGNOSIS — J20.8 ACUTE BRONCHITIS DUE TO OTHER SPECIFIED ORGANISMS: ICD-10-CM

## 2019-01-13 PROCEDURE — 99232 SBSQ HOSP IP/OBS MODERATE 35: CPT | Mod: GC

## 2019-01-13 RX ADMIN — ENOXAPARIN SODIUM 40 MILLIGRAM(S): 100 INJECTION SUBCUTANEOUS at 12:23

## 2019-01-13 RX ADMIN — Medication 3 MILLILITER(S): at 20:30

## 2019-01-13 RX ADMIN — Medication 100 MILLIGRAM(S): at 12:23

## 2019-01-13 RX ADMIN — Medication 3 MILLILITER(S): at 08:20

## 2019-01-13 RX ADMIN — DONEPEZIL HYDROCHLORIDE 5 MILLIGRAM(S): 10 TABLET, FILM COATED ORAL at 21:24

## 2019-01-13 RX ADMIN — Medication 3 MILLILITER(S): at 05:54

## 2019-01-13 RX ADMIN — SODIUM CHLORIDE 60 MILLILITER(S): 9 INJECTION INTRAMUSCULAR; INTRAVENOUS; SUBCUTANEOUS at 21:31

## 2019-01-13 NOTE — PROGRESS NOTE ADULT - ASSESSMENT
89y F bedbound with 24 hr home care  h/o dementia, depression was sent from the urgent care for cough x 1.5 weeks and fever since 1 day.  Pt is afebrile overnight and no acute events overnight. Plan for discharge on monday.      A/P: 89 y/o F with   1. Acute respiratory distress due to corona virus upper respiratory infection  2. Dementia with functional quadriplegia  3. Dysphagia  4. essential HTN  5. Frailty

## 2019-01-13 NOTE — PROGRESS NOTE ADULT - SUBJECTIVE AND OBJECTIVE BOX
MEDICAL ATTENDING NOTE    Patient is a 89y old  Female who presents with a chief complaint of Sepsis and tachycardia (12 Jan 2019 09:36)      INTERVAL HPI/OVERNIGHT EVENTS: no new complaints per home attendant or nursingstaff    MEDICATIONS  (STANDING):  ALBUTerol/ipratropium for Nebulization 3 milliLiter(s) Nebulizer every 6 hours  dextrose 5% + sodium chloride 0.9%. 1000 milliLiter(s) (70 mL/Hr) IV Continuous <Continuous>  donepezil 5 milliGRAM(s) Oral at bedtime  enoxaparin Injectable 40 milliGRAM(s) SubCutaneous every 24 hours  sodium chloride 0.9%. 1000 milliLiter(s) (60 mL/Hr) IV Continuous <Continuous>    MEDICATIONS  (PRN):  acetaminophen   Tablet .. 325 milliGRAM(s) Oral every 4 hours PRN Temp greater or equal to 38C (100.4F), Moderate Pain (4 - 6)      __________________________________________________  ----------------------------------------------------------------------------------  REVIEW OF SYSTEMS: no fever, no SOB; dementia++      Vital Signs Last 24 Hrs  T(C): 36.8 (13 Jan 2019 15:01), Max: 36.8 (12 Jan 2019 21:32)  T(F): 98.3 (13 Jan 2019 15:01), Max: 98.3 (13 Jan 2019 15:01)  HR: 99 (13 Jan 2019 15:01) (99 - 113)  BP: 151/62 (13 Jan 2019 15:01) (151/62 - 171/74)  BP(mean): --  RR: 18 (13 Jan 2019 15:01) (18 - 18)  SpO2: 100% (13 Jan 2019 15:01) (96% - 100%)    _________________  PHYSICAL EXAM:  ---------------------------   NAD; Normocephalic;   LUNGS - no wheezing; fair bilateral air entry  HEART: S1 S2+   ABDOMEN: Soft, Nontender, non distended; BS+  EXTREMITIES: no cyanosis; no edema  NERVOUS SYSTEM:  Awake ;no new deficits    _________________________________________________  LABS:

## 2019-01-13 NOTE — PROGRESS NOTE ADULT - PROBLEM SELECTOR PLAN 2
Dementia with functional quadriplegia-   Supportive measures  Aspiration precautions   Fall precautions

## 2019-01-14 PROCEDURE — 99232 SBSQ HOSP IP/OBS MODERATE 35: CPT | Mod: GC

## 2019-01-14 RX ADMIN — ENOXAPARIN SODIUM 40 MILLIGRAM(S): 100 INJECTION SUBCUTANEOUS at 12:53

## 2019-01-14 RX ADMIN — Medication 3 MILLILITER(S): at 20:42

## 2019-01-14 RX ADMIN — DONEPEZIL HYDROCHLORIDE 5 MILLIGRAM(S): 10 TABLET, FILM COATED ORAL at 21:17

## 2019-01-14 RX ADMIN — Medication 3 MILLILITER(S): at 14:26

## 2019-01-14 RX ADMIN — Medication 3 MILLILITER(S): at 09:36

## 2019-01-14 NOTE — PROGRESS NOTE ADULT - ASSESSMENT
89y F bedbound with 24 hr home care  h/o dementia, depression was sent from the urgent care for cough x 1.5 weeks and fever since 1 day.  Pt is afebrile overnight and no acute events overnight. Plan for discharge in AM. 89y F bedbound with 24 hr home care  h/o dementia, depression was sent from the urgent care for cough x 1.5 weeks and fever admitted for corona virus infection.   Pt is afebrile overnight and no acute events overnight. Plan for discharge in AM.

## 2019-01-14 NOTE — PROGRESS NOTE ADULT - PROBLEM SELECTOR PROBLEM 1
Viral upper respiratory tract infection
Acute viral bronchitis

## 2019-01-14 NOTE — PROGRESS NOTE ADULT - PROBLEM SELECTOR PROBLEM 2
Sepsis, due to unspecified organism
Alzheimer's disease of other onset without behavioral disturbance

## 2019-01-14 NOTE — PROGRESS NOTE ADULT - SUBJECTIVE AND OBJECTIVE BOX
PGY1 Note discussed with supervising resident and primary attending.    Patient is a 89y old  Female who presents with a chief complaint of Sepsis and tachycardia (13 Jan 2019 15:52)      INTERVAL HPI/OVERNIGHT EVENTS: Patient clinically stable for discharge. Pending reinstatement of HHA.     MEDICATIONS  (STANDING):  ALBUTerol/ipratropium for Nebulization 3 milliLiter(s) Nebulizer every 6 hours  donepezil 5 milliGRAM(s) Oral at bedtime  enoxaparin Injectable 40 milliGRAM(s) SubCutaneous every 24 hours    MEDICATIONS  (PRN):  acetaminophen   Tablet .. 325 milliGRAM(s) Oral every 4 hours PRN Temp greater or equal to 38C (100.4F), Moderate Pain (4 - 6)      Allergies    No Known Allergies    Intolerances        REVIEW OF SYSTEMS:  difficult to obtain due to dementia    Vital Signs Last 24 Hrs  T(C): 36.8 (14 Jan 2019 14:55), Max: 37.2 (13 Jan 2019 21:58)  T(F): 98.2 (14 Jan 2019 14:55), Max: 99 (13 Jan 2019 21:58)  HR: 103 (14 Jan 2019 14:57) (94 - 117)  BP: 184/75 (14 Jan 2019 14:57) (138/66 - 185/85)  BP(mean): --  RR: 17 (14 Jan 2019 14:57) (17 - 20)  SpO2: 99% (14 Jan 2019 14:57) (98% - 100%)    PHYSICAL EXAM:  GENERAL: NAD, well-groomed  CHEST/LUNG: Clear to asucultation  HEART: Regular rate and rhythm; No murmurs, rubs, or gallops  ABDOMEN: Soft, Nontender, Nondistended; Bowel sounds present  NERVOUS SYSTEM:  Awake; no focal deficits  EXTREMITIES:  2+ Peripheral Pulses, No edema

## 2019-01-14 NOTE — PROGRESS NOTE ADULT - PROBLEM SELECTOR PLAN 1
RVP positive- corona virus, chest x-ray normal  Supportive measures  Dysphagia pureed diet with no liquids for risk of aspiration

## 2019-01-14 NOTE — PROGRESS NOTE ADULT - ATTENDING COMMENTS
Patient was seen and examined by myself. Case was discussed with house staff in details. I have reviewed and agree with the plan as outlined above with edits where appropriate.    Vital Signs Last 24 Hrs  T(C): 36.8 (14 Jan 2019 14:55), Max: 37.2 (13 Jan 2019 21:58)  T(F): 98.2 (14 Jan 2019 14:55), Max: 99 (13 Jan 2019 21:58)  HR: 98 (14 Jan 2019 15:15) (94 - 117)  BP: 136/82 (14 Jan 2019 15:15) (136/82 - 185/85)  BP(mean): --  RR: 18 (14 Jan 2019 15:15) (17 - 20)  SpO2: 100% (14 Jan 2019 15:15) (98% - 100%)      Clinically stable.  Discharge planning Patient was seen and examined by myself. Case was discussed with house staff in details. I have reviewed and agree with the plan as outlined above with edits where appropriate.    Vital Signs Last 24 Hrs  T(C): 36.8 (14 Jan 2019 14:55), Max: 37.2 (13 Jan 2019 21:58)  T(F): 98.2 (14 Jan 2019 14:55), Max: 99 (13 Jan 2019 21:58)  HR: 98 (14 Jan 2019 15:15) (94 - 117)  BP: 136/82 (14 Jan 2019 15:15) (136/82 - 185/85)  BP(mean): --  RR: 18 (14 Jan 2019 15:15) (17 - 20)  SpO2: 100% (14 Jan 2019 15:15) (98% - 100%)      Clinically stable. BP control optimization  Discharge planning.  Palliative evaluation  Overall prognosis is poor.

## 2019-01-14 NOTE — PROGRESS NOTE ADULT - PROBLEM SELECTOR PROBLEM 4
Need for prophylactic measure
Alzheimer's disease of other onset without behavioral disturbance
Need for prophylactic measure

## 2019-01-15 VITALS — SYSTOLIC BLOOD PRESSURE: 166 MMHG | DIASTOLIC BLOOD PRESSURE: 73 MMHG | HEART RATE: 112 BPM

## 2019-01-15 DIAGNOSIS — R53.81 OTHER MALAISE: ICD-10-CM

## 2019-01-15 DIAGNOSIS — E44.0 MODERATE PROTEIN-CALORIE MALNUTRITION: ICD-10-CM

## 2019-01-15 DIAGNOSIS — Z51.5 ENCOUNTER FOR PALLIATIVE CARE: ICD-10-CM

## 2019-01-15 PROCEDURE — 36415 COLL VENOUS BLD VENIPUNCTURE: CPT

## 2019-01-15 PROCEDURE — 83880 ASSAY OF NATRIURETIC PEPTIDE: CPT

## 2019-01-15 PROCEDURE — 85610 PROTHROMBIN TIME: CPT

## 2019-01-15 PROCEDURE — 99285 EMERGENCY DEPT VISIT HI MDM: CPT | Mod: 25

## 2019-01-15 PROCEDURE — 71045 X-RAY EXAM CHEST 1 VIEW: CPT

## 2019-01-15 PROCEDURE — 84443 ASSAY THYROID STIM HORMONE: CPT

## 2019-01-15 PROCEDURE — 83605 ASSAY OF LACTIC ACID: CPT

## 2019-01-15 PROCEDURE — 80053 COMPREHEN METABOLIC PANEL: CPT

## 2019-01-15 PROCEDURE — 94640 AIRWAY INHALATION TREATMENT: CPT

## 2019-01-15 PROCEDURE — 80048 BASIC METABOLIC PNL TOTAL CA: CPT

## 2019-01-15 PROCEDURE — 84484 ASSAY OF TROPONIN QUANT: CPT

## 2019-01-15 PROCEDURE — 87798 DETECT AGENT NOS DNA AMP: CPT

## 2019-01-15 PROCEDURE — 87581 M.PNEUMON DNA AMP PROBE: CPT

## 2019-01-15 PROCEDURE — 82803 BLOOD GASES ANY COMBINATION: CPT

## 2019-01-15 PROCEDURE — 93005 ELECTROCARDIOGRAM TRACING: CPT

## 2019-01-15 PROCEDURE — 85730 THROMBOPLASTIN TIME PARTIAL: CPT

## 2019-01-15 PROCEDURE — 87486 CHLMYD PNEUM DNA AMP PROBE: CPT

## 2019-01-15 PROCEDURE — 85027 COMPLETE CBC AUTOMATED: CPT

## 2019-01-15 PROCEDURE — 99223 1ST HOSP IP/OBS HIGH 75: CPT

## 2019-01-15 PROCEDURE — 87633 RESP VIRUS 12-25 TARGETS: CPT

## 2019-01-15 PROCEDURE — 92610 EVALUATE SWALLOWING FUNCTION: CPT

## 2019-01-15 RX ORDER — IPRATROPIUM/ALBUTEROL SULFATE 18-103MCG
3 AEROSOL WITH ADAPTER (GRAM) INHALATION
Qty: 60 | Refills: 0 | OUTPATIENT
Start: 2019-01-15 | End: 2019-01-19

## 2019-01-15 RX ADMIN — Medication 3 MILLILITER(S): at 09:27

## 2019-01-15 NOTE — CONSULT NOTE ADULT - PROBLEM SELECTOR PROBLEM 1
Alzheimer's disease of other onset without behavioral disturbance Sepsis, due to unspecified organism

## 2019-01-15 NOTE — CONSULT NOTE ADULT - PROBLEM SELECTOR RECOMMENDATION 9
Bedbound, non verbal, dependent on all ADLs.  FAST 7c. No behavioral issues currently.   Hospice eligible 2/2 viral URI; no evidence of bacterial infections; CXR negative...improving

## 2019-01-15 NOTE — CONSULT NOTE ADULT - PROBLEM SELECTOR RECOMMENDATION 2
Per HHA pt has good days and bad days with her eating.  Albumin 3.2. Bedbound, non verbal, dependent on all ADLs.  FAST 7c. No behavioral issues currently.   Hospice eligible

## 2019-01-15 NOTE — CONSULT NOTE ADULT - PROBLEM SELECTOR RECOMMENDATION 4
Spoke with pt's son Cam Levy who is the HCP.  Discussed pt's clinical status and disease trajectory.  Mr. Levy expressed that the patient is well maintained at home with the HHA; pt's oldest son is mentally challenged and has lived his whole life with her. Regarding GOC, explained cardiopulmonary resuscitation, intubation, and artificial feeds.  Per the HCP he needs to have a discussion with his siblings before making any further decisions.   Patient is DNR with trial of intubation.  MOLST drafted and placed in the chart.    All questions answered.  Support provided.    Advanced care planning time spent 20 minutes. Bedbound, dependent on all ADLs.

## 2019-01-15 NOTE — CONSULT NOTE ADULT - PROBLEM SELECTOR RECOMMENDATION 3
Bedbound, dependent on all ADLs. Per HHA pt has good days and bad days with her eating.  Albumin 3.2.

## 2019-01-15 NOTE — CONSULT NOTE ADULT - PROBLEM SELECTOR RECOMMENDATION 5
Spoke with pt's son Cam Levy who is the HCP.  Discussed pt's clinical status and disease trajectory.  Mr. Levy expressed that the patient is well maintained at home with the HHA; pt's oldest son is mentally challenged and has lived his whole life with her. Regarding GOC, explained cardiopulmonary resuscitation, intubation, and artificial feeds.  Per the HCP he needs to have a discussion with his siblings before making any further decisions.   Patient is DNR with trial of intubation.  MOLST drafted and placed in the chart.    All questions answered.  Support provided.    Advanced care planning time spent 20 minutes.

## 2020-05-12 ENCOUNTER — APPOINTMENT (OUTPATIENT)
Dept: DISASTER EMERGENCY | Facility: HOSPITAL | Age: 85
End: 2020-05-12

## 2020-05-12 ENCOUNTER — MESSAGE (OUTPATIENT)
Age: 85
End: 2020-05-12

## 2021-11-16 NOTE — ED PROVIDER NOTE - NS_EDPROVIDERDISPOUSERTYPE_ED_A_ED
Render Risk Assessment In Note?: yes Detail Level: Simple Additional Notes: Patient consent was obtained to proceed with the visit and recommended plan of care after discussion of all risks and benefits, including the risks of COVID-19 exposure. Attending Attestation (For Attendings USE Only)...